# Patient Record
Sex: FEMALE | Race: WHITE | Employment: STUDENT | URBAN - METROPOLITAN AREA
[De-identification: names, ages, dates, MRNs, and addresses within clinical notes are randomized per-mention and may not be internally consistent; named-entity substitution may affect disease eponyms.]

---
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
## 2020-08-20 NOTE — PROGRESS NOTES
SUBJECTIVE:                                                   Fanny Ricketts is a 19 year old female who presents to clinic today for the following health issue(s):  Patient presents with:  Contraception: Birth control F/U. Patient felt like she was hungry all the time. She had trouble taking it at night.      HPI:  Pt here today for her annual med check on OCP's. We started her on lo ovral back last fall but she only took it for 1 month. She had weight gain, and increased appetite on it. No headaches/migraines. No VTE or BTB.     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 Feels she can remember a pill daily.     Menses are still regular. Heavy, cramping x1 day.     Not currently s.a.     Patient's last menstrual period was 07/27/2020 (approximate)..     Patient is not sexually active, No obstetric history on file..  Using none for contraception.    reports that she has never smoked. She has never used smokeless tobacco.    STD testing offered?  Declined    Health maintenance updated:  yes    Today's PHQ-2 Score:   PHQ-2 ( 1999 Pfizer) 8/22/2019   Q1: Little interest or pleasure in doing things 0   Q2: Feeling down, depressed or hopeless 0   PHQ-2 Score 0     Today's PHQ-9 Score:   PHQ-9 SCORE 8/21/2020   PHQ-9 Total Score 1     Today's SHILPI-7 Score:   SHILPI-7 SCORE 8/21/2020   Total Score 0       Problem list and histories reviewed & adjusted, as indicated.  Additional history: as documented.    Patient Active Problem List   Diagnosis     Dysmenorrhea     Past Surgical History:   Procedure Laterality Date     NO HISTORY OF SURGERY  08/22/2019      Social History     Tobacco Use     Smoking status: Never Smoker     Smokeless tobacco: Never Used   Substance Use Topics     Alcohol use: Yes      Problem (# of Occurrences) Relation (Name,Age of Onset)    No Known Problems (8) Mother, Father, Sister, Brother, Maternal Grandmother, Maternal Grandfather, Paternal Grandmother, Other            Current  "Outpatient Medications   Medication Sig     levonorgestrel-ethinyl estradiol (AVIANE) 0.1-20 MG-MCG tablet Take 1 tablet by mouth daily     No current facility-administered medications for this visit.      Allergies   Allergen Reactions     Penicillin G Rash       ROS:  12 point review of systems negative other than symptoms noted below or in the HPI.  Gastrointestinal: Hungry  No urinary frequency or dysuria, bladder or kidney problems, Normal menstrual cycles      OBJECTIVE:     /76   Ht 1.702 m (5' 7\")   Wt 69.6 kg (153 lb 6.4 oz)   LMP 07/27/2020 (Approximate)   Breastfeeding No   BMI 24.03 kg/m    Body mass index is 24.03 kg/m .    Exam:  Constitutional:  Appearance: Well nourished, well developed alert, in no acute distress  Chest:  Respiratory Effort:  Breathing unlabored. Clear to auscultation bilaterally.   Cardiovascular: Heart: Auscultation:  Regular rate, normal rhythm, no murmurs present  Gastrointestinal:  Abdominal Examination:  Abdomen nontender to palpation, tone normal without rigidity or guarding, no masses present, umbilicus without lesions; Liver/Spleen:  No hepatomegaly present, liver nontender to palpation; Hernias:  No hernias present  Lymphatic: Lymph Nodes:  No other lymphadenopathy present  Skin: General Inspection:  No rashes present, no lesions present, no areas of discoloration.  Neurologic:  Mental Status:  Oriented X3.  Normal strength and tone, sensory exam grossly normal, mentation intact and speech normal.    Psychiatric:  Mentation appears normal and affect normal/bright.     In-Clinic Test Results:  No results found for this or any previous visit (from the past 24 hour(s)).    ASSESSMENT/PLAN:                                                        ICD-10-CM    1. Dysmenorrhea  N94.6 levonorgestrel-ethinyl estradiol (AVIANE) 0.1-20 MG-MCG tablet       There are no Patient Instructions on file for this visit.    Normal exam. Pap and pelvic due at age 21. Will try lutera. " No contraindications for OCP's.   Also discussed IUD and NR. Pt wants the pill.     CAL Handley CNP  [facility] FOR [facility]

## 2020-08-21 ENCOUNTER — OFFICE VISIT (OUTPATIENT)
Dept: OBGYN | Facility: CLINIC | Age: 20
End: 2020-08-21
Payer: COMMERCIAL

## 2020-08-21 ENCOUNTER — APPOINTMENT (OUTPATIENT)
Dept: URBAN - METROPOLITAN AREA CLINIC 256 | Age: 20
Setting detail: DERMATOLOGY
End: 2020-08-21

## 2020-08-21 VITALS — RESPIRATION RATE: 15 BRPM | WEIGHT: 145 LBS | HEIGHT: 67 IN

## 2020-08-21 VITALS
BODY MASS INDEX: 24.08 KG/M2 | DIASTOLIC BLOOD PRESSURE: 76 MMHG | SYSTOLIC BLOOD PRESSURE: 102 MMHG | WEIGHT: 153.4 LBS | HEIGHT: 67 IN

## 2020-08-21 DIAGNOSIS — N94.6 DYSMENORRHEA: Primary | ICD-10-CM

## 2020-08-21 DIAGNOSIS — B07.8 OTHER VIRAL WARTS: ICD-10-CM

## 2020-08-21 PROCEDURE — 17110 DESTRUCT B9 LESION 1-14: CPT

## 2020-08-21 PROCEDURE — 99214 OFFICE O/P EST MOD 30 MIN: CPT | Mod: 25

## 2020-08-21 PROCEDURE — OTHER COUNSELING: OTHER

## 2020-08-21 PROCEDURE — 99395 PREV VISIT EST AGE 18-39: CPT | Performed by: NURSE PRACTITIONER

## 2020-08-21 PROCEDURE — OTHER LIQUID NITROGEN: OTHER

## 2020-08-21 RX ORDER — LEVONORGESTREL/ETHIN.ESTRADIOL 0.1-0.02MG
1 TABLET ORAL DAILY
Qty: 84 TABLET | Refills: 3 | Status: SHIPPED | OUTPATIENT
Start: 2020-08-21

## 2020-08-21 ASSESSMENT — LOCATION SIMPLE DESCRIPTION DERM
LOCATION SIMPLE: LEFT ELBOW
LOCATION SIMPLE: RIGHT THIGH

## 2020-08-21 ASSESSMENT — ANXIETY QUESTIONNAIRES
IF YOU CHECKED OFF ANY PROBLEMS ON THIS QUESTIONNAIRE, HOW DIFFICULT HAVE THESE PROBLEMS MADE IT FOR YOU TO DO YOUR WORK, TAKE CARE OF THINGS AT HOME, OR GET ALONG WITH OTHER PEOPLE: NOT DIFFICULT AT ALL
6. BECOMING EASILY ANNOYED OR IRRITABLE: NOT AT ALL
1. FEELING NERVOUS, ANXIOUS, OR ON EDGE: NOT AT ALL
7. FEELING AFRAID AS IF SOMETHING AWFUL MIGHT HAPPEN: NOT AT ALL
3. WORRYING TOO MUCH ABOUT DIFFERENT THINGS: NOT AT ALL
2. NOT BEING ABLE TO STOP OR CONTROL WORRYING: NOT AT ALL
5. BEING SO RESTLESS THAT IT IS HARD TO SIT STILL: NOT AT ALL
GAD7 TOTAL SCORE: 0

## 2020-08-21 ASSESSMENT — PATIENT HEALTH QUESTIONNAIRE - PHQ9
SUM OF ALL RESPONSES TO PHQ QUESTIONS 1-9: 1
5. POOR APPETITE OR OVEREATING: NOT AT ALL

## 2020-08-21 ASSESSMENT — LOCATION ZONE DERM
LOCATION ZONE: ARM
LOCATION ZONE: LEG

## 2020-08-21 ASSESSMENT — MIFFLIN-ST. JEOR: SCORE: 1503.45

## 2020-08-21 ASSESSMENT — LOCATION DETAILED DESCRIPTION DERM
LOCATION DETAILED: LEFT ELBOW
LOCATION DETAILED: RIGHT ANTERIOR DISTAL THIGH

## 2020-08-21 NOTE — PROCEDURE: LIQUID NITROGEN
Detail Level: Detailed
Add 52 Modifier (Optional): no
Medical Necessity Information: It is in your best interest to select a reason for this procedure from the list below. All of these items fulfill various CMS LCD requirements except the new and changing color options.
Consent: The patient's consent was obtained including but not limited to risks of crusting, scabbing, blistering, scarring, darker or lighter pigmentary change, recurrence, incomplete removal and infection.
Medical Necessity Clause: This procedure was medically necessary because the lesions that were treated were:
Duration Of Freeze Thaw-Cycle (Seconds): 3
Post-Care Instructions: I reviewed with the patient in detail post-care instructions. Patient is to wear sunprotection, and avoid picking at any of the treated lesions. Pt may apply Vaseline to crusted or scabbing areas.
Number Of Freeze-Thaw Cycles: 2 freeze-thaw cycles
Render Post-Care Instructions In Note?: yes

## 2020-08-22 ASSESSMENT — ANXIETY QUESTIONNAIRES: GAD7 TOTAL SCORE: 0

## 2021-05-07 ENCOUNTER — OFFICE VISIT (OUTPATIENT)
Dept: OBGYN | Facility: CLINIC | Age: 21
End: 2021-05-07
Payer: COMMERCIAL

## 2021-05-07 VITALS — WEIGHT: 148 LBS | DIASTOLIC BLOOD PRESSURE: 64 MMHG | SYSTOLIC BLOOD PRESSURE: 112 MMHG | BODY MASS INDEX: 23.18 KG/M2

## 2021-05-07 DIAGNOSIS — Z97.5 NEXPLANON IN PLACE: ICD-10-CM

## 2021-05-07 DIAGNOSIS — Z30.09 ENCOUNTER FOR OTHER GENERAL COUNSELING OR ADVICE ON CONTRACEPTION: Primary | ICD-10-CM

## 2021-05-07 DIAGNOSIS — Z30.017 NEXPLANON INSERTION: Primary | ICD-10-CM

## 2021-05-07 DIAGNOSIS — Z30.017 INSERTION OF IMPLANTABLE SUBDERMAL CONTRACEPTIVE: ICD-10-CM

## 2021-05-07 LAB — HCG, QUAL URINE: NORMAL

## 2021-05-07 PROCEDURE — 84703 CHORIONIC GONADOTROPIN ASSAY: CPT | Performed by: ADVANCED PRACTICE MIDWIFE

## 2021-05-07 PROCEDURE — 11981 INSERTION DRUG DLVR IMPLANT: CPT | Performed by: ADVANCED PRACTICE MIDWIFE

## 2021-05-07 PROCEDURE — 99213 OFFICE O/P EST LOW 20 MIN: CPT | Mod: 25 | Performed by: ADVANCED PRACTICE MIDWIFE

## 2021-05-07 NOTE — PROGRESS NOTES
Pt desires Nexplanon insertion for contraception. Uses condoms with intercourse every time. Will continue to use for STI prevention    Nexplanon Insertion Procedure Note    Pre-operative Diagnosis: desires Nexplanon for contraception    Post-operative Diagnosis: same    Indications: contraception    Procedure Details   Urine pregnancy test was done  and result was negative.     NEXPLANON has been discussed with healthcare provider who answered all   questions. It was explained that there are benefits as well as risks with using NEXPLANON. The patient understands that there are other birth control methods and that each has its own benefits and risks.  She also understands that she needs to sign a consent form to show  that she is making an  informed and careful decision to use NEXPLANON, and that she has read and understood the following points.    NEXPLANON helps to keep her from getting pregnant.    No contraceptive method is 100% effective, including NEXPLANON.    NEXPLANON has an implant that contains a hormone.    After the implant is placed in my arm, the patient should check that it is in place by gently pressing her fingertips over the skin where the implant was placed. She should be able to feel the implant.    The implant must be removed at the end of three years. The implant can be removed sooner if  she wants it removed.    If she has trouble finding a healthcare provider to remove the implant, she can call [phone] for help.    The implant is placed under the skin of the arm during a procedure done in a healthcare provider s office. There is a slight risk of getting a scar or an infection from this procedure.    Removal is usually a minor procedure. Sometimes, removal may be more difficult. Special procedures, including surgery in the hospital, may be needed. Difficult removals may cause pain and scarring and may result in injury to nerves and blood vessels. If the implant is not removed, its  effects may continue.    Most women have changes in their menstrual bleeding patterns while using NEXPLANON.  Bleeding may be irregular, lighter or heavier, or bleeding may completely stop. If the patient thinks she is pregnant, she should contact my healthcare provider as soon as possible.    the patient verbalizes her understanding of  the warning signs for problems with NEXPLANON. She has been advised to seek medical attention if any warning signs appear.    She should tell all her healthcare providers that she is using NEXPLANON.    The patient was advised to have a medical checkup regularly and at any time she is having problems.    NEXPLANON does not provide protection from HIV infection (AIDS) or any other sexually transmitted diseases.  After learning about NEXPLANON, the patient chose to use NEXPLANON.     The risks (including infection, bleeding, pain) and benefits of the procedure were explained to the patient and Written informed consent was obtained.      Pt was positioned on exam table with left, non dominant arm next to her on the table. Insertion site was cleaned with alcohol prep at 8cm from medial epicondyl. 1%Lidocaine inserted intradermally and Nexplanon was inserted without difficulty. Site was covered with band aid and a pressure dressing. Pt instructed not to lift heavy items with left arm for 24 hours, Ibuprofen prn for pain or ice to site as need for bruising    Nexplanon Information:  Lot # [id number]  Exp date: Sept 1 2023.    Condition:  Stable    Complications:  None    Plan:    The patient was advised to call for any fever or for prolonged or severe pain or bleeding. She was advised to use OTC ibuprofen as needed for mild to moderate pain.     CAL La, GOYO

## 2021-05-07 NOTE — NURSING NOTE
"Chief Complaint   Patient presents with     Contraception     consult       Initial /64 (BP Location: Right arm, Cuff Size: Adult Regular)   Wt 67.1 kg (148 lb)   LMP 2021   BMI 23.18 kg/m   Estimated body mass index is 23.18 kg/m  as calculated from the following:    Height as of 20: 1.702 m (5' 7\").    Weight as of this encounter: 67.1 kg (148 lb).  BP completed using cuff size: regular    Questioned patient about current smoking habits.  Pt. has never smoked.        Antonette Howard CMA         "

## 2021-05-07 NOTE — PROGRESS NOTES
SUBJECTIVE:                                                      Fanny is a 20 year old  female who presents for birth control discussion.      Currently using oral contraceptives for contraception.  Interested in a lower maintenance option. Briefly discussed Nexplanon vs IUD at last physical-- friends have Nexplanon, is mostly interested in this but desires more information about Mirena IUD as well. Has not been missing pills but accepts UPT prior to LARC insertion.    GYNECOLOGIC HISTORY:    HISTORY:  OB History    Para Term  AB Living   0 0 0 0 0 0   SAB TAB Ectopic Multiple Live Births   0 0 0 0 0     Past Medical History:   Diagnosis Date     NO ACTIVE PROBLEMS 2019     Past Surgical History:   Procedure Laterality Date     NO HISTORY OF SURGERY  2019     Family History   Problem Relation Age of Onset     No Known Problems Mother      No Known Problems Father      No Known Problems Sister      No Known Problems Brother      No Known Problems Maternal Grandmother      No Known Problems Maternal Grandfather      No Known Problems Paternal Grandmother      No Known Problems Other      Social History     Socioeconomic History     Marital status: Single     Spouse name: None     Number of children: None     Years of education: None     Highest education level: None   Occupational History     None   Social Needs     Financial resource strain: None     Food insecurity     Worry: None     Inability: None     Transportation needs     Medical: None     Non-medical: None   Tobacco Use     Smoking status: Never Smoker     Smokeless tobacco: Never Used   Substance and Sexual Activity     Alcohol use: Yes     Drug use: Never     Sexual activity: Never     Comment: Virginal   Lifestyle     Physical activity     Days per week: None     Minutes per session: None     Stress: None   Relationships     Social connections     Talks on phone: None     Gets together: None     Attends Tenriism  service: None     Active member of club or organization: None     Attends meetings of clubs or organizations: None     Relationship status: None     Intimate partner violence     Fear of current or ex partner: None     Emotionally abused: None     Physically abused: None     Forced sexual activity: None   Other Topics Concern     Parent/sibling w/ CABG, MI or angioplasty before 65F 55M? Not Asked   Social History Narrative     None       Current Outpatient Medications:      levonorgestrel-ethinyl estradiol (AVIANE) 0.1-20 MG-MCG tablet, Take 1 tablet by mouth daily, Disp: 84 tablet, Rfl: 3     Allergies   Allergen Reactions     Penicillin G Rash       Past medical, surgical, social and family history were reviewed and updated in EPIC.    ROS:   12 point ROS otherwise negative    OBJECTIVE:                                                      EXAM:  /64 (BP Location: Right arm, Cuff Size: Adult Regular)   Wt 67.1 kg (148 lb)   LMP 04/27/2021   BMI 23.18 kg/m     BMI: Body mass index is 23.18 kg/m .  Exam:  Constitutional: healthy, alert and no distress  Psychiatric: mentation appears normal and affect normal/bright  No other exam was necessary today, as this was entirely a counseling appointment.    ASSESSMENT/PLAN:                                                      20 year old female for birth control consult.    Discussed Nexplanon vs hormonal IUD for LARC-- discussed risks, benefits, and alternatives to each. She is choosing to proceed with Nexplanon. Desires scheduling today.     CAL La, CNM    Total time = 20 minutes on day of service, including chart review, face to face time, entering orders, and documentation.

## 2023-01-11 ENCOUNTER — APPOINTMENT (OUTPATIENT)
Dept: URBAN - METROPOLITAN AREA CLINIC 256 | Age: 23
Setting detail: DERMATOLOGY
End: 2023-01-11

## 2023-01-11 DIAGNOSIS — Z41.9 ENCOUNTER FOR PROCEDURE FOR PURPOSES OTHER THAN REMEDYING HEALTH STATE, UNSPECIFIED: ICD-10-CM

## 2023-01-11 PROCEDURE — OTHER LIGHTSHEER INFINITY: OTHER

## 2023-01-11 NOTE — PROCEDURE: LIGHTSHEER INFINITY
Total Pulses: 1
Fluence: 16
Spot Size: LR (9 x 27mm)
Cooling: chill tip
External Cooling Fan Speed: 0
Pulse Per Second: 3
Pulse Duration: 30 ms
Post-Care Instructions: I reviewed with the patient in detail post-care instructions. Patient should avoid sun for a minimum of 4 weeks before and after treatment.
Skin Type (Optional): I
Wavelength: 805nm (Infinity)
Hair Color (Optional): Black
Detail Level: Simple
Consent: Written consent obtained, risks reviewed including but not limited to crusting, scabbing, blistering, scarring, darker or lighter pigmentary change, paradoxical hair regrowth, incomplete removal of hair and infection.
Topical Anesthesia?: BLT cream (benzocaine 20%, lidocaine 10%, tetracaine 10%)
Length Of Topical Anesthesia Application (Optional): 20 minutes
Hair Texture (Optional): mixed
Endpoint: Immediate endpoint: perifollicular erythema and edema. Post care reviewed with patient.

## 2023-02-02 ENCOUNTER — APPOINTMENT (OUTPATIENT)
Dept: URBAN - METROPOLITAN AREA CLINIC 281 | Age: 23
Setting detail: DERMATOLOGY
End: 2023-02-02

## 2023-03-01 ENCOUNTER — APPOINTMENT (OUTPATIENT)
Dept: URBAN - METROPOLITAN AREA CLINIC 256 | Age: 23
Setting detail: DERMATOLOGY
End: 2023-03-01

## 2023-03-01 DIAGNOSIS — Z41.9 ENCOUNTER FOR PROCEDURE FOR PURPOSES OTHER THAN REMEDYING HEALTH STATE, UNSPECIFIED: ICD-10-CM

## 2023-03-01 PROCEDURE — OTHER LIGHTSHEER INFINITY: OTHER

## 2023-03-01 NOTE — PROCEDURE: LIGHTSHEER INFINITY
Length Of Topical Anesthesia Application (Optional): 20 minutes
Hair Color (Optional): Black
Fluence: 17
Consent: Written consent obtained, risks reviewed including but not limited to crusting, scabbing, blistering, scarring, darker or lighter pigmentary change, paradoxical hair regrowth, incomplete removal of hair and infection.
Wavelength: 805nm (Infinity)
Post-Care Instructions: I reviewed with the patient in detail post-care instructions. Patient should avoid sun for a minimum of 4 weeks before and after treatment.
Cooling: chill tip
Skin Type (Optional): I
External Cooling Fan Speed: 0
Topical Anesthesia?: BLT cream (benzocaine 20%, lidocaine 10%, tetracaine 10%)
Detail Level: Simple
Spot Size: LR (9 x 27mm)
Hair Texture (Optional): coarse
Pulse Duration: auto
Endpoint: Immediate endpoint: perifollicular erythema and edema. Post care reviewed with patient.

## 2023-04-26 ENCOUNTER — APPOINTMENT (OUTPATIENT)
Dept: URBAN - METROPOLITAN AREA CLINIC 256 | Age: 23
Setting detail: DERMATOLOGY
End: 2023-04-26

## 2023-04-26 DIAGNOSIS — Z41.9 ENCOUNTER FOR PROCEDURE FOR PURPOSES OTHER THAN REMEDYING HEALTH STATE, UNSPECIFIED: ICD-10-CM

## 2023-04-26 PROCEDURE — OTHER LIGHTSHEER INFINITY: OTHER

## 2023-04-26 NOTE — PROCEDURE: LIGHTSHEER INFINITY
Pulse Per Second: 1
Hair Color (Optional): Black
Pulse Duration: auto
Cooling: chill tip
Endpoint: Immediate endpoint: perifollicular erythema and edema. Post care reviewed with patient.
Skin Type (Optional): I
External Cooling Fan Speed: 0
Fluence: 17
Topical Anesthesia?: BLT cream (benzocaine 20%, lidocaine 10%, tetracaine 10%)
Post-Care Instructions: I reviewed with the patient in detail post-care instructions. Patient should avoid sun for a minimum of 4 weeks before and after treatment.
Detail Level: Simple
Wavelength: 805nm (Infinity)
Spot Size: LR (9 x 27mm)
Consent: Written consent obtained, risks reviewed including but not limited to crusting, scabbing, blistering, scarring, darker or lighter pigmentary change, paradoxical hair regrowth, incomplete removal of hair and infection.
Hair Texture (Optional): fine

## 2023-04-27 ENCOUNTER — APPOINTMENT (OUTPATIENT)
Dept: URBAN - METROPOLITAN AREA CLINIC 281 | Age: 23
Setting detail: DERMATOLOGY
End: 2023-04-27

## 2023-04-27 DIAGNOSIS — Z41.9 ENCOUNTER FOR PROCEDURE FOR PURPOSES OTHER THAN REMEDYING HEALTH STATE, UNSPECIFIED: ICD-10-CM

## 2023-04-27 PROCEDURE — OTHER JEUVEAU (U OR CC): OTHER

## 2023-06-06 ENCOUNTER — APPOINTMENT (OUTPATIENT)
Dept: URBAN - METROPOLITAN AREA CLINIC 281 | Age: 23
Setting detail: DERMATOLOGY
End: 2023-06-06

## 2023-06-06 DIAGNOSIS — Z41.9 ENCOUNTER FOR PROCEDURE FOR PURPOSES OTHER THAN REMEDYING HEALTH STATE, UNSPECIFIED: ICD-10-CM

## 2023-06-06 PROCEDURE — OTHER LIGHTSHEER INFINITY: OTHER

## 2023-10-25 ENCOUNTER — APPOINTMENT (OUTPATIENT)
Dept: URBAN - METROPOLITAN AREA CLINIC 256 | Age: 23
Setting detail: DERMATOLOGY
End: 2023-10-25

## 2023-10-25 DIAGNOSIS — Z41.9 ENCOUNTER FOR PROCEDURE FOR PURPOSES OTHER THAN REMEDYING HEALTH STATE, UNSPECIFIED: ICD-10-CM

## 2023-10-25 PROCEDURE — OTHER LIGHTSHEER INFINITY: OTHER

## 2023-11-13 ENCOUNTER — APPOINTMENT (OUTPATIENT)
Dept: URBAN - METROPOLITAN AREA CLINIC 256 | Age: 23
Setting detail: DERMATOLOGY
End: 2023-11-13

## 2023-11-13 VITALS — WEIGHT: 150 LBS | HEIGHT: 67 IN

## 2023-11-13 DIAGNOSIS — L40.4 GUTTATE PSORIASIS: ICD-10-CM

## 2023-11-13 PROCEDURE — OTHER COUNSELING: OTHER

## 2023-11-13 PROCEDURE — OTHER PRESCRIPTION MEDICATION MANAGEMENT: OTHER

## 2023-11-13 PROCEDURE — OTHER MIPS QUALITY: OTHER

## 2023-11-13 PROCEDURE — OTHER PRESCRIPTION: OTHER

## 2023-11-13 PROCEDURE — 99204 OFFICE O/P NEW MOD 45 MIN: CPT

## 2023-11-13 RX ORDER — TRIAMCINOLONE ACETONIDE 1 MG/G
OINTMENT TOPICAL
Qty: 80 | Refills: 0 | Status: ERX | COMMUNITY
Start: 2023-11-13

## 2023-11-13 RX ORDER — HYDROCORTISONE 25 MG/G
OINTMENT TOPICAL
Qty: 28.35 | Refills: 0 | Status: ERX | COMMUNITY
Start: 2023-11-13

## 2023-11-13 ASSESSMENT — LOCATION SIMPLE DESCRIPTION DERM
LOCATION SIMPLE: LEFT THIGH
LOCATION SIMPLE: RIGHT THIGH
LOCATION SIMPLE: RIGHT FOREHEAD
LOCATION SIMPLE: RIGHT FOREARM
LOCATION SIMPLE: LEFT FOREARM
LOCATION SIMPLE: ABDOMEN
LOCATION SIMPLE: LEFT UPPER BACK
LOCATION SIMPLE: CHEST
LOCATION SIMPLE: LEFT TEMPLE
LOCATION SIMPLE: LOWER BACK

## 2023-11-13 ASSESSMENT — ITCH NUMERIC RATING SCALE: HOW SEVERE IS YOUR ITCHING?: 0

## 2023-11-13 ASSESSMENT — LOCATION DETAILED DESCRIPTION DERM
LOCATION DETAILED: LEFT SUPERIOR MEDIAL UPPER BACK
LOCATION DETAILED: PERIUMBILICAL SKIN
LOCATION DETAILED: UPPER STERNUM
LOCATION DETAILED: RIGHT FOREHEAD
LOCATION DETAILED: LEFT VENTRAL PROXIMAL FOREARM
LOCATION DETAILED: LEFT ANTERIOR PROXIMAL THIGH
LOCATION DETAILED: RIGHT VENTRAL PROXIMAL FOREARM
LOCATION DETAILED: LEFT MID TEMPLE
LOCATION DETAILED: RIGHT ANTERIOR DISTAL THIGH
LOCATION DETAILED: SUPERIOR LUMBAR SPINE

## 2023-11-13 ASSESSMENT — LOCATION ZONE DERM
LOCATION ZONE: TRUNK
LOCATION ZONE: FACE
LOCATION ZONE: LEG
LOCATION ZONE: ARM

## 2023-11-13 ASSESSMENT — PGA PSORIASIS: PGA PSORIASIS 2020: MILD

## 2023-11-13 ASSESSMENT — BSA PSORIASIS: % BODY COVERED IN PSORIASIS: 10

## 2023-11-13 NOTE — PROCEDURE: PRESCRIPTION MEDICATION MANAGEMENT
Render In Strict Bullet Format?: No
Plan: RTC in 1 month if still persisting, sooner if worsening.
Initiate Treatment: Apply triamcinolone 0.1% ointment to affected areas on body twice daily x1 week, then once daily x1 week.\\nApply hydrocortisone 2.5% ointment to affected areas on face twice daily x1 week, then once daily x1 week.
Detail Level: Zone